# Patient Record
Sex: FEMALE | Race: WHITE
[De-identification: names, ages, dates, MRNs, and addresses within clinical notes are randomized per-mention and may not be internally consistent; named-entity substitution may affect disease eponyms.]

---

## 2018-05-20 NOTE — EDM.PDOC
ED HPI GENERAL MEDICAL PROBLEM





- General


Chief Complaint: Upper Extremity Injury/Pain


Stated Complaint: [id number] SOMETHING WRONG WITH SHOULDER


Time Seen by Provider: 05/20/18 01:47


Source of Information: Reports: Patient


History Limitations: Reports: No Limitations





- History of Present Illness


INITIAL COMMENTS - FREE TEXT/NARRATIVE: 





pulled back muscle few days ago didn't really get bad till tonight hurts to 

take a breath and pain shoots straight front. 


  ** Left Shoulder


Pain Score (Numeric/FACES): 9





- Related Data


 Allergies











Allergy/AdvReac Type Severity Reaction Status Date / Time


 


No Known Allergies Allergy   Verified 05/20/18 01:18











Home Meds: 


 Home Meds





busPIRone [Buspar] 15 mg PO BID 05/20/18 [History]











Past Medical History


Gastrointestinal History: Reports: Cholelithiasis


OB/GYN History: Reports: Pregnancy





- Past Surgical History


GI Surgical History: Reports: Cholecystectomy


Female  Surgical History: Reports: D&C





Social & Family History





- Tobacco Use


Smoking Status *Q: Current Every Day Smoker


Years of Tobacco use: 14


Packs/Tins Daily: 0.7


Second Hand Smoke Exposure: Yes





- Recreational Drug Use


Recreational Drug Use: No





Review of Systems





- Review of Systems


Review Of Systems: ROS reveals no pertinent complaints other than HPI.





ED EXAM, GENERAL





- Physical Exam


Exam: See Below


Exam Limited By: No Limitations


General Appearance: Alert, WD/WN, Mild Distress, Other (discomfort)


Ears: Hearing Grossly Normal


Throat/Mouth: Normal Voice, No Airway Compromise


Head: Atraumatic


Neck: Non-Tender, Full Range of Motion


Respiratory/Chest: No Respiratory Distress


Cardiovascular: Regular Rate, Rhythm


GI/Abdominal: Soft, Non-Tender


Back Exam: Muscle Spasm, Paraspinal Tenderness, Other (left interscap/rhomboid 

spasm R/P without radiculits)


Neurological: Alert, Oriented, Normal Cognition, Normal Gait, No Motor/Sensory 

Deficits


Psychiatric: Tearful


Skin Exam: Warm, Dry, Normal Color


Lymphatic: No Adenopathy





Course





- Vital Signs


Last Recorded V/S: 


 Last Vital Signs











Temp  36.4 C   05/20/18 01:14


 


Pulse  62   05/20/18 01:14


 


Resp  18   05/20/18 01:14


 


BP  118/62   05/20/18 01:14


 


Pulse Ox  100   05/20/18 01:14














- Orders/Labs/Meds


Meds: 


Medications














Discontinued Medications














Generic Name Dose Route Start Last Admin





  Trade Name Emanuelq  PRN Reason Stop Dose Admin


 


Ketorolac Tromethamine  30 mg  05/20/18 01:47  05/20/18 01:53





  Toradol  IM  05/20/18 01:48  30 mg





  ONETIME ONE   Administration





     





     





     





     














- Re-Assessments/Exams


Free Text/Narrative Re-Assessment/Exam: 





05/20/18 02:18


s/p IM toradol = somewhat better but wants to go home to sleep





Departure





- Departure


Time of Disposition: 02:18


Disposition: Home, Self-Care 01


Condition: Good


Clinical Impression: 


 Interscapular pain








- Discharge Information


Instructions:  Shoulder Pain, Easy-to-Read


Forms:  ED Department Discharge


Additional Instructions: 


1) avoid bending lifting straining next 5 to 7 days


2) try ice or heat to sore areas


3) recheck as needed





rx given;


flexeril 10mg bid prn x 12


vicodin 5/325mg bid prn x 6

## 2021-06-17 ENCOUNTER — HOSPITAL ENCOUNTER (EMERGENCY)
Dept: HOSPITAL 43 - DL.ED | Age: 31
Discharge: LEFT BEFORE DISCHARGE | End: 2021-06-17
Payer: SELF-PAY

## 2021-06-17 DIAGNOSIS — T78.40XA: Primary | ICD-10-CM

## 2021-06-17 DIAGNOSIS — Z53.21: ICD-10-CM
